# Patient Record
Sex: FEMALE | Race: OTHER | HISPANIC OR LATINO | URBAN - METROPOLITAN AREA
[De-identification: names, ages, dates, MRNs, and addresses within clinical notes are randomized per-mention and may not be internally consistent; named-entity substitution may affect disease eponyms.]

---

## 2023-05-18 ENCOUNTER — EMERGENCY (EMERGENCY)
Facility: HOSPITAL | Age: 33
LOS: 1 days | Discharge: HOME | End: 2023-05-18
Attending: STUDENT IN AN ORGANIZED HEALTH CARE EDUCATION/TRAINING PROGRAM
Payer: MEDICAID

## 2023-05-18 VITALS
OXYGEN SATURATION: 100 % | SYSTOLIC BLOOD PRESSURE: 132 MMHG | HEART RATE: 91 BPM | RESPIRATION RATE: 16 BRPM | DIASTOLIC BLOOD PRESSURE: 84 MMHG | TEMPERATURE: 98 F | WEIGHT: 130.07 LBS

## 2023-05-18 PROCEDURE — 99284 EMERGENCY DEPT VISIT MOD MDM: CPT

## 2023-05-18 NOTE — ED ADULT TRIAGE NOTE - CHIEF COMPLAINT QUOTE
Ambulatory  pregnant 10 weeks complaining of HA that started this afternoon @1700. Reports taking 4 acetaminophen tablets without relief. Reports that she had a HA all last weekend which resolved for approx 3-4 days but has now returned even stronger. Denies history of pre-eclampsia.

## 2023-05-19 VITALS
DIASTOLIC BLOOD PRESSURE: 74 MMHG | SYSTOLIC BLOOD PRESSURE: 113 MMHG | RESPIRATION RATE: 17 BRPM | OXYGEN SATURATION: 99 % | HEART RATE: 74 BPM | TEMPERATURE: 98 F

## 2023-05-19 LAB
APPEARANCE UR: CLEAR — SIGNIFICANT CHANGE UP
BACTERIA # UR AUTO: ABNORMAL
BILIRUB UR-MCNC: NEGATIVE — SIGNIFICANT CHANGE UP
COLOR SPEC: YELLOW — SIGNIFICANT CHANGE UP
DIFF PNL FLD: NEGATIVE — SIGNIFICANT CHANGE UP
EPI CELLS # UR: SIGNIFICANT CHANGE UP
GLUCOSE UR QL: NEGATIVE MG/DL — SIGNIFICANT CHANGE UP
KETONES UR-MCNC: ABNORMAL
LEUKOCYTE ESTERASE UR-ACNC: NEGATIVE — SIGNIFICANT CHANGE UP
NITRITE UR-MCNC: NEGATIVE — SIGNIFICANT CHANGE UP
PH UR: 7 — SIGNIFICANT CHANGE UP (ref 5–8)
PROT UR-MCNC: NEGATIVE — SIGNIFICANT CHANGE UP
RBC CASTS # UR COMP ASSIST: SIGNIFICANT CHANGE UP /HPF (ref 0–4)
SP GR SPEC: 1.01 — SIGNIFICANT CHANGE UP (ref 1.01–1.02)
UROBILINOGEN FLD QL: NEGATIVE MG/DL — SIGNIFICANT CHANGE UP
WBC UR QL: SIGNIFICANT CHANGE UP /HPF (ref 0–5)

## 2023-05-19 PROCEDURE — 81001 URINALYSIS AUTO W/SCOPE: CPT

## 2023-05-19 PROCEDURE — 96375 TX/PRO/DX INJ NEW DRUG ADDON: CPT

## 2023-05-19 PROCEDURE — 96374 THER/PROPH/DIAG INJ IV PUSH: CPT

## 2023-05-19 PROCEDURE — 99284 EMERGENCY DEPT VISIT MOD MDM: CPT | Mod: 25

## 2023-05-19 PROCEDURE — T1013: CPT

## 2023-05-19 PROCEDURE — 87086 URINE CULTURE/COLONY COUNT: CPT

## 2023-05-19 PROCEDURE — 96361 HYDRATE IV INFUSION ADD-ON: CPT

## 2023-05-19 RX ORDER — ONDANSETRON 8 MG/1
4 TABLET, FILM COATED ORAL ONCE
Refills: 0 | Status: COMPLETED | OUTPATIENT
Start: 2023-05-19 | End: 2023-05-19

## 2023-05-19 RX ORDER — ACETAMINOPHEN 500 MG
1000 TABLET ORAL ONCE
Refills: 0 | Status: COMPLETED | OUTPATIENT
Start: 2023-05-19 | End: 2023-05-19

## 2023-05-19 RX ORDER — METOCLOPRAMIDE HCL 10 MG
10 TABLET ORAL ONCE
Refills: 0 | Status: COMPLETED | OUTPATIENT
Start: 2023-05-19 | End: 2023-05-19

## 2023-05-19 RX ORDER — SODIUM CHLORIDE 9 MG/ML
1000 INJECTION, SOLUTION INTRAVENOUS ONCE
Refills: 0 | Status: COMPLETED | OUTPATIENT
Start: 2023-05-19 | End: 2023-05-19

## 2023-05-19 RX ADMIN — SODIUM CHLORIDE 1000 MILLILITER(S): 9 INJECTION, SOLUTION INTRAVENOUS at 04:02

## 2023-05-19 RX ADMIN — SODIUM CHLORIDE 1000 MILLILITER(S): 9 INJECTION, SOLUTION INTRAVENOUS at 02:40

## 2023-05-19 RX ADMIN — Medication 10 MILLIGRAM(S): at 04:46

## 2023-05-19 RX ADMIN — Medication 400 MILLIGRAM(S): at 02:40

## 2023-05-19 RX ADMIN — ONDANSETRON 4 MILLIGRAM(S): 8 TABLET, FILM COATED ORAL at 02:40

## 2023-05-19 NOTE — ED PROCEDURE NOTE - NS ED PROCEDURE ASSISTED BY
Supervision was available Well appearing, awake, alert, oriented to person, place, time/situation and in no apparent distress. normal...

## 2023-05-19 NOTE — ED PROCEDURE NOTE - PROCEDURE ADDITIONAL DETAILS
Educational Bedside U/S performed, IUP with . Explained to pt u/s study educational and not diagnostic and verbal consent provided.

## 2023-05-19 NOTE — ED PROVIDER NOTE - CLINICAL SUMMARY MEDICAL DECISION MAKING FREE TEXT BOX
31 yo female with pmhx of H/A's presents with a H/A since yesterday. Neurovascularly intact, no FND's. improvement of symptoms after meds. BP improved. UA without evidence of acute infectious processes. strict return precautions explained.

## 2023-05-19 NOTE — ED PROVIDER NOTE - PHYSICAL EXAMINATION
Gen: No acute distress, non toxic  HEENT: Mucous membranes moist, pink conjunctivae, EOMI no nystagmus. PERRL.   Neck: FROM. No neck stiffness.   CV: RRR, nl s1/s2.  Resp: CTAB, normal rate and effort. No wheezes, rhonchi or crackles  GI: Abdomen soft, NT, ND. No rebound, no guarding  Neuro: A&O x 3, moving all 4 extremities  MSK: No spine or joint tenderness to palpation  Skin: No rashes. intact and perfused. cap refill <2sec  Vascular: Radial and dorsalis pedal pulses 2+ b/l. No LE edema. Gen: No acute distress, non toxic. well appearing.  HEENT: Mucous membranes moist, pink conjunctivae, EOMI no nystagmus. PERRL. airway patent  Neck: FROM. No neck stiffness. neck supple   CV: RRR, nl s1/s2.  Resp: CTAB, normal rate and effort. No wheezes, rhonchi or crackles  GI: Abdomen soft, NT, ND. No rebound, no guarding  Neuro: A&O x4, MAEx4. 5/5 str ext x 4. Sensation intact, symmetric throughout. No FNDs. Gait intact. CN 2-12 intact. cerebellar fxn intact.   MSK: No spine or joint tenderness to palpation  Skin: No rashes. intact and perfused. cap refill <2sec  Vascular: Radial and dorsalis pedal pulses 2+ b/l. No LE edema.

## 2023-05-19 NOTE — ED PROVIDER NOTE - PATIENT PORTAL LINK FT
You can access the FollowMyHealth Patient Portal offered by [facility] by registering at the following website: [email or web address]. By joining "[email or web address]’s FollowMyHealth portal, you will also be able to view your health information using other applications (apps) compatible with our system.

## 2023-05-19 NOTE — ED PROVIDER NOTE - NS ED ROS FT
Gen: denies fever, chills,  Skin: denies rashes  HEENT: denies visual changes, ear pain, nasal congestion, throat pain  Respiratory: denies CHEUNG, SOB, cough, wheezing  Cardiovascular: denies chest pain, palpitations, diaphoresis, LE edema  GI: denies abdominal pain, n/v/d  : denies dysuria, frequency, urgency, bowel/bladder incontinence  MSK: denies joint swelling/pain, back pain, neck pain  Neuro: +headache. denies dizziness, LOC, weakness, numbness

## 2023-05-19 NOTE — ED ADULT NURSE NOTE - ED CARDIAC RATE
[History of Hormone Replacement Treatment] : has no history of hormone replacement treatment [FreeTextEntry2] : Miscarriage 1 normal [FreeTextEntry6] : No [FreeTextEntry7] : hx of OCP use [FreeTextEntry8] : n/a

## 2023-05-19 NOTE — ED PROVIDER NOTE - ATTENDING APP SHARED VISIT CONTRIBUTION OF CARE
32y F w/ no significant PMH, currently 9.5 weeks pregnant, presents for headache. Says it feels similar to prior headaches. No fever, chest pain, SOB, vaginal bleeding, urinary complaints. No focal neuro deficits on exam. Initially mildly hypertensive. Felt better with symptomatic treatment. Rpt BP improved. Discharged in stable condition with strict return precautions.

## 2023-05-19 NOTE — ED PROVIDER NOTE - OBJECTIVE STATEMENT
33 yo female with pmhx of H/A's presents with a H/A since yesterday.  Pt reports this H/A feels similar to the H/A's she has had in the past 31 yo female with pmhx of H/A's presents with a H/A since yesterday.  Pt reports this H/A feels similar to the H/A's she has had in the past    Denies fever, chills, body aches, dizziness, LOC, vision changes, CP, palpitations, SOB, abd pain, N/V/C/D, dysuria, hematuria, vaginal discharge, vaginal bleeding, back pain, paresthesias in the extremities, rashes. 31 yo female with pmhx of H/A's presents with a H/A since yesterday. Pt reports she is 9 and a half wks pregnant. Pt reports a H/A "all over" and states it has been a gradual onset. Denies worst H/A of her life. Pt reports this H/A feels similar to the H/A's she has had in the past. Reports a H/A similarly last week, went to  and was given tylenol with caffeine which improved her symptoms. Has been taking tylenol at home for pain- last dose was at 6pm, only little relief. Denies photophobia. Assoc with nausea. Pt also endorsing dysuria. Denies fever, chills, body aches, dizziness, LOC, vision changes, CP, palpitations, SOB, abd pain, N/V/C/D, dysuria, hematuria, vaginal discharge, vaginal bleeding, back pain, neck pain, paresthesias in the extremities, rashes.

## 2023-05-19 NOTE — ED PROVIDER NOTE - NSFOLLOWUPINSTRUCTIONS_ED_ALL_ED_FT
- Please follow-up with your primary care doctor in the next 1-2 days.  Please call tomorrow for an appointment.  If you cannot follow-up with your primary care doctor please return to the ED for any urgent issues.  - You were given a copy of the tests performed today.  Please bring the results with you and review them with your primary care doctor.  - If you have any worsening of symptoms or any other concerns please return to the ED immediately.  - Please continue taking your home medications as directed.     Headache    A headache is pain or discomfort felt around the head or neck area. The specific cause of a headache may not be found as there are many types including tension headaches, migraine headaches, and cluster headaches. Watch your condition for any changes. Things you can do to manage your pain include taking over the counter and prescription medications as instructed by your health care provider, lying down in a dark quiet room, limiting stress, getting regular sleep, and refraining from alcohol and tobacco products.    SEEK IMMEDIATE MEDICAL CARE IF YOU HAVE ANY OF THE FOLLOWING SYMPTOMS: fever, vomiting, stiff neck, loss of vision, problems with speech, muscle weakness, loss of balance, trouble walking, passing out, or confusion.     - Realice un seguimiento con cook médico de atención primaria en los próximos 1 o 2 ernie. Por favor llame mañana para joelle brittany. Si no puede hacer un seguimiento con cook médico de atención primaria, regrese al servicio de urgencias para cualquier problema urgente.  - Se le entregó joelle copia de las pruebas realizadas hoy. Traiga los resultados con usted y revíselos con cook médico de atención primaria.  - Si tiene algún empeoramiento de los síntomas o cualquier otra inquietud, regrese al servicio de urgencias de inmediato.  - Continúe tomando gabe medicamentos en el hogar según las indicaciones.    Dolor de cesar    Un dolor de cesar es un dolor o molestia que se siente alrededor del área de la cesar o el charissa. Es posible que no se encuentre la causa específica de un dolor de cesar, ya que hay muchos tipos, incluidos los siri de cesar por tensión, los siri de cesar por migraña y los siri de cesar en racimo. Wendi cook condición para cualquier cambio. Las cosas que puede hacer para controlar cook dolor incluyen emiliano medicamentos de venta lu y recetados según las instrucciones de cook proveedor de atención médica, recostarse en joelle habitación oscura y tranquila, limitar el estrés, dormir con regularidad y abstenerse de consumir alcohol y productos de tabaco.    BUSQUE ATENCIÓN MÉDICA INMEDIATA SI TIENE ALGUNO DE LOS SIGUIENTES SÍNTOMAS: fiebre, vómitos, rigidez en el charissa, pérdida de la visión, problemas con el habla, debilidad muscular, pérdida del equilibrio, dificultad para caminar, desmayo o confusión.

## 2023-05-20 LAB
CULTURE RESULTS: SIGNIFICANT CHANGE UP
SPECIMEN SOURCE: SIGNIFICANT CHANGE UP

## 2023-05-31 PROBLEM — Z00.00 ENCOUNTER FOR PREVENTIVE HEALTH EXAMINATION: Status: ACTIVE | Noted: 2023-05-31

## 2023-06-13 ENCOUNTER — NON-APPOINTMENT (OUTPATIENT)
Age: 33
End: 2023-06-13

## 2023-06-14 ENCOUNTER — APPOINTMENT (OUTPATIENT)
Dept: ANTEPARTUM | Facility: CLINIC | Age: 33
End: 2023-06-14
Payer: MEDICAID

## 2023-06-14 ENCOUNTER — ASOB RESULT (OUTPATIENT)
Age: 33
End: 2023-06-14

## 2023-06-14 DIAGNOSIS — N91.2 AMENORRHEA, UNSPECIFIED: ICD-10-CM

## 2023-06-14 DIAGNOSIS — O35.10X0 MATERNAL CARE FOR (SUSPECTED) CHROMOSOMAL ABNORMALITY IN FETUS, UNSPECIFIED, NOT APPLICABLE OR UNSPECIFIED: ICD-10-CM

## 2023-06-14 PROCEDURE — 36416 COLLJ CAPILLARY BLOOD SPEC: CPT

## 2023-06-14 PROCEDURE — 76813 OB US NUCHAL MEAS 1 GEST: CPT

## 2023-06-19 LAB
ADDITIONAL US: NORMAL
COMMENTS: AFP: NORMAL
CRL SCAN TWIN B: NORMAL
CRL SCAN: NORMAL
CROWN RUMP LENGTH TWIN B: NORMAL
CROWN RUMP LENGTH: 70.2 MM
DOWN SYNDROME AGE RISK: NORMAL
DOWN SYNDROME INTERPRETATION: NORMAL
DOWN SYNDROME SCREENING RISK: NORMAL
GEST. AGE ON COLLECTION DATE: 13 WEEKS
HCG MOM: 0.65
HCG VALUE: 61.1 IU/ML
MATERNAL AGE AT EDD: 31.2 YR
NOTE: AFP: NORMAL
NT MOM TWIN B: NORMAL
NT TWIN B: NORMAL
NUCHAL TRANSLUCENCY (NT): 1.8 MM
NUCHAL TRANSLUCENCY MOM: 1.01
NUMBER OF FETUSES: 1
PAPP-A MOM: 0.98
PAPP-A VALUE: 1309.8 NG/ML
RACE: NORMAL
RESULTS AFP: NORMAL
SONOGRAPHER ID#: NORMAL
SUBMIT PART 2 SAMPLE USING: NORMAL
TEST RESULTS: AFP: NORMAL
TRISOMY 18 AGE RISK: NORMAL
TRISOMY 18 INTERPRETATION: NORMAL
TRISOMY 18 SCREENING RISK: NORMAL
WEIGHT AFP: 139 LBS

## 2023-06-21 ENCOUNTER — ASOB RESULT (OUTPATIENT)
Age: 33
End: 2023-06-21

## 2023-06-21 ENCOUNTER — APPOINTMENT (OUTPATIENT)
Dept: MATERNAL FETAL MEDICINE | Facility: CLINIC | Age: 33
End: 2023-06-21
Payer: MEDICAID

## 2023-06-21 PROCEDURE — 99442: CPT

## 2023-06-27 ENCOUNTER — APPOINTMENT (OUTPATIENT)
Dept: ANTEPARTUM | Facility: CLINIC | Age: 33
End: 2023-06-27
Payer: MEDICAID

## 2023-06-27 DIAGNOSIS — Z13.71 ENCOUNTER FOR NONPROCREATIVE SCREENING FOR GENETIC DISEASE CARRIER STATUS: ICD-10-CM

## 2023-06-27 PROCEDURE — 36415 COLL VENOUS BLD VENIPUNCTURE: CPT

## 2023-07-12 ENCOUNTER — APPOINTMENT (OUTPATIENT)
Dept: MATERNAL FETAL MEDICINE | Facility: CLINIC | Age: 33
End: 2023-07-12
Payer: MEDICAID

## 2023-07-12 ENCOUNTER — ASOB RESULT (OUTPATIENT)
Age: 33
End: 2023-07-12

## 2023-07-12 PROCEDURE — 99442: CPT

## 2023-07-18 ENCOUNTER — APPOINTMENT (OUTPATIENT)
Dept: ANTEPARTUM | Facility: CLINIC | Age: 33
End: 2023-07-18

## 2023-07-18 ENCOUNTER — APPOINTMENT (OUTPATIENT)
Dept: ANTEPARTUM | Facility: CLINIC | Age: 33
End: 2023-07-18
Payer: MEDICAID

## 2023-07-18 PROCEDURE — 36415 COLL VENOUS BLD VENIPUNCTURE: CPT

## 2023-07-24 ENCOUNTER — APPOINTMENT (OUTPATIENT)
Dept: ANTEPARTUM | Facility: CLINIC | Age: 33
End: 2023-07-24

## 2023-07-24 LAB
ADDITIONAL US: NORMAL
AFP MOM: 0.73
AFP VALUE: 31.5 NG/ML
COLLECTED ON 2: NORMAL
COLLECTED ON: NORMAL
CRL SCAN TWIN B: NORMAL
CRL SCAN: NORMAL
CROWN RUMP LENGTH TWIN B: NORMAL
CROWN RUMP LENGTH: 70.2 MM
DIA MOM: 1.21
DIA VALUE: 189.8 PG/ML
DOWN SYNDROME AGE RISK: NORMAL
DOWN SYNDROME INTERPRETATION: NORMAL
DOWN SYNDROME SCREENING RISK: NORMAL
FIRST TRIMESTER SAMPLE: NORMAL
GEST. AGE ON COLLECTION DATE: 13 WEEKS
GESTATIONAL AGE: 17.9 WEEKS
HCG MOM: 0.38
HCG VALUE: 10.6 IU/ML
INSULIN DEP DIABETES: NO
MATERNAL AGE AT EDD: 31.2 YR
NT MOM TWIN B: NORMAL
NT TWIN B: NORMAL
NUCHAL TRANSLUCENCY (NT): 1.8 MM
NUCHAL TRANSLUCENCY MOM: 1.01
NUMBER OF FETUSES: 1
OPEN SPINA BIFIDA: NORMAL
OSB INTERPRETATION: NORMAL
PAPP-A MOM: 0.98
PAPP-A VALUE: 1309.8 NG/ML
RACE: NORMAL
SECOND TRIMESTER SAMPLE: NORMAL
SEQUENTIAL 2 COMMENTS: NORMAL
SEQUENTIAL 2 NOTE: NORMAL
SEQUENTIAL 2 RESULTS: NORMAL
SEQUENTIAL 2 TEST RESULTS: NORMAL
SONOGRAPHER ID#: NORMAL
TRISOMY 18 AGE RISK: NORMAL
TRISOMY 18 INTERPRETATION: NORMAL
TRISOMY 18 SCREENING RISK: NORMAL
UE3 MOM: 0.97
UE3 VALUE: 1.35 NG/ML
WEIGHT AFP: 139 LBS
WEIGHT: 143 LBS

## 2023-07-25 DIAGNOSIS — Z86.32 PERSONAL HISTORY OF GESTATIONAL DIABETES: ICD-10-CM

## 2023-07-25 DIAGNOSIS — G43.909 MIGRAINE, UNSPECIFIED, NOT INTRACTABLE, W/OUT STATUS MIGRAINOSUS: ICD-10-CM

## 2023-07-25 DIAGNOSIS — Z01.818 ENCOUNTER FOR OTHER PREPROCEDURAL EXAMINATION: ICD-10-CM

## 2023-07-25 DIAGNOSIS — Z33.2 ENCOUNTER FOR ELECTIVE TERMINATION OF PREGNANCY: ICD-10-CM

## 2023-07-27 ENCOUNTER — APPOINTMENT (OUTPATIENT)
Dept: OBGYN | Facility: CLINIC | Age: 33
End: 2023-07-27
Payer: MEDICAID

## 2023-07-27 PROCEDURE — 99443: CPT

## 2023-07-27 RX ORDER — IBUPROFEN 600 MG/1
600 TABLET, FILM COATED ORAL 4 TIMES DAILY
Qty: 60 | Refills: 0 | Status: ACTIVE | COMMUNITY
Start: 2023-07-27 | End: 1900-01-01

## 2023-07-27 RX ORDER — ONDANSETRON 4 MG/1
4 TABLET ORAL
Qty: 20 | Refills: 0 | Status: ACTIVE | COMMUNITY
Start: 2023-07-27 | End: 1900-01-01

## 2023-07-27 RX ORDER — CABERGOLINE 0.5 MG/1
0.5 TABLET ORAL
Qty: 2 | Refills: 0 | Status: ACTIVE | COMMUNITY
Start: 2023-07-27 | End: 1900-01-01

## 2023-07-27 RX ORDER — DOXYCYCLINE HYCLATE 100 MG/1
100 TABLET ORAL
Qty: 2 | Refills: 0 | Status: ACTIVE | COMMUNITY
Start: 2023-07-27 | End: 1900-01-01

## 2023-07-28 NOTE — PLAN
[FreeTextEntry1] : 33 yo  at 19w1d with plan for DE at 20w1d  EDC 2023 by LMP 3/15/23.\par \par \par 1.Dilation and Evacuation \par -All available records and ultrasounds have been reviewed \par - Pt does not desire induction of labor\par -All consents reviewed and/or signed today, all questions/concerns addressed\par - Patient offered pamphlet for support services- will give in person\par - Reviewed disposal of remains, hospital vs. private burial.  Patient understands her responsibility for fetal disposition. Will consult SW in house.\par \par 2. Surgery scheduling\par - Patient to be precertified for D+E\par - D+E scheduled for 8/3/23\par - PSTs not required\par - CBC  testing and reviewed\par \par 3. ID/Cervical prep\par - GC/CT- will offer at time of dilators with full STI testing\par - doxycycline 200 mg in OR\par - Ibuprofen 600 mg po q 6 hours - Rx sent\par - doxycycline 100mg BID x 1 day for day of dilator placement\par \par 4. Labs/Blood type\par - Preop CBC and Covid test\par - Type and Screen on arrival\par - Rhogam pending results\par \par 5. Contraception/Future Pregnancy Plans\par - Patient counseled on all contraceptive options\par - Patient thinking about hormonal IUD\par \par 6. > 18 weeks, breast health reviewed\par - cabergoline 1mg post op Rx sent\par - Cold compresses, tight bras, ibuprofen reviewed\par \par 7. Post-op\par - Post-operative telehealth or in person visit optional- to be scheduled in 2 weeks\par - Post-operative instruction sheet reviewed/given, reviewed bleeding and infection precautions\par - Provided 24 hour contact phone number\par - All questions/concerns of patient addressed to their satisfaction\par

## 2023-07-28 NOTE — HISTORY OF PRESENT ILLNESS
[FreeTextEntry1] : Audiovisual Televisit\par [facility]\par Provider location: office, [address] [place] [address], [place], [place]\par \par  # [id number]\par \par 33 yo  at 19w1d by EDC 2023 by LMP 3/15/23\par \par All: ASA, peptobismol- rash\par Meds: denies\par Obhx: c-secx1, med ab x1\par Gynhx: denies\par PMh/PSH: rhinoplasty, ; reports irregular heart beat at time of rhinoplasty- pt told it was minor and did not need follow up\par SH: deneisx3\par \par D+E Counseling\par \par Risks of D&E including:\par 1.	Infection: Patient was counseled on risk of infection and the use of prophylactic antibiotics, signs/symptoms of pre- and post-operative infection were reviewed. \par 2.	Hemorrhage: Patient was counseled on the risk of hemorrhage, possibly requiring blood (and/or blood products) transfusion, management including use of but not limited to uterotonic medications. PT HAS NO OBJECTIONS TO BLOOD TRANSFUSION OR RECEIVING BLOOD PRODUCTS.\par 3.	Injury/Perforation:  Risk of injury to vagina, cervix, uterus reviewed. Patient was counseled on the risk of uterine perforation with/without need for laparoscopy/laparotomy with/without injury to adjacent organs such as bowel/bladder. Reviewed risk of hysterectomy.\par 4.            Risk of retained products of conception  with/without need for medication or suction procedure to empty the uterus. \par \par The evidence to support minimal risk of harm to subsequent pregnancies or the ability to carry a subsequent pregnancy to term, and absence of evidence supporting adverse psychological effects were discussed.  \par \par Need for cervical ripening with mifepristone, pre-operative laminaria placement were also discussed; the accompanying risks of infection, bleeding, injury, rupture of membranes, and  labor were reviewed. The risks of delivery of a nonviable  were reviewed.  They understand these risks and agrees to above. They are aware to go to [facility] for any emergency and to avoid [facility].\par \par The patient also understands it is their responsibility to bring to the attention of their physician any unusual symptoms following the  and to report to follow-up examinations.  \par \par Medication management reviewed.\par \par [place] regulations were reviewed and since the pregnancy is greater than 20 weeks it is the patient is aware of their role in disposition of fetal remains. \par \par They are sure of their decision and deny any coercion from family, friends or healthcare providers. The patient had the opportunity to ask questions and all questions were answered.  \par \par

## 2023-08-02 ENCOUNTER — APPOINTMENT (OUTPATIENT)
Dept: OBGYN | Facility: CLINIC | Age: 33
End: 2023-08-02
Payer: MEDICAID

## 2023-08-02 ENCOUNTER — TRANSCRIPTION ENCOUNTER (OUTPATIENT)
Age: 33
End: 2023-08-02

## 2023-08-02 VITALS
SYSTOLIC BLOOD PRESSURE: 102 MMHG | DIASTOLIC BLOOD PRESSURE: 74 MMHG | HEART RATE: 102 BPM | TEMPERATURE: 98.6 F | OXYGEN SATURATION: 98 %

## 2023-08-02 VITALS — DIASTOLIC BLOOD PRESSURE: 70 MMHG | SYSTOLIC BLOOD PRESSURE: 120 MMHG

## 2023-08-02 DIAGNOSIS — Z11.3 ENCOUNTER FOR SCREENING FOR INFECTIONS WITH A PREDOMINANTLY SEXUAL MODE OF TRANSMISSION: ICD-10-CM

## 2023-08-02 PROCEDURE — 99215 OFFICE O/P EST HI 40 MIN: CPT | Mod: 25

## 2023-08-02 PROCEDURE — 59200 INSERT CERVICAL DILATOR: CPT

## 2023-08-02 RX ORDER — MIFEPRISTONE 200 MG
200 TABLET ORAL
Refills: 0 | Status: COMPLETED | OUTPATIENT
Start: 2023-08-02

## 2023-08-02 RX ORDER — MIFEPRISTONE 200 MG
200 TABLET ORAL
Refills: 0 | Status: ACTIVE | COMMUNITY
Start: 2023-08-02

## 2023-08-02 RX ADMIN — Medication 0 MG: at 00:00

## 2023-08-02 NOTE — PLAN
[FreeTextEntry1] : 33 yo s/p dilator insertion and mifepristone administration for DE tomorrow. - SW consult for funneral home disposition and IPV - LNG IUD added to consent All questions answered.  ADDENDUM Pt visibly upset after dilator insertion and tearful. I counseled on potential removal of dilators and risks of continuing pregnancy including but not limited to infection,  labor,  delivery, ROM, miscarriage. Pt expresses that she is 100% sure of her decision to terminate but wishes her circumstance were different. Referral to [facility] program will be seen by inpatient SW.

## 2023-08-02 NOTE — PHYSICAL EXAM
[Chaperone Present] : A chaperone was present in the examining room during all aspects of the physical examination [Appropriately responsive] : appropriately responsive [Alert] : alert [No Acute Distress] : no acute distress [Soft] : soft [Non-tender] : non-tender [Non-distended] : non-distended [Oriented x3] : oriented x3 [FreeTextEntry1] : Elia Kemp LPN

## 2023-08-02 NOTE — HISTORY OF PRESENT ILLNESS
[FreeTextEntry1] : 33 yo at 20 weeks presenting for dilators and mifepristone for induced . : Daria Lancaster RN  Pt discloses today that her partner is very jealous. They are no longer living together. He wants her to keep the pregnancy. He is not the FOB. She says she is not in danger nor is her daughter.  contraception counseling performed. All LARC/SARC methods reviewed. Pt would like a post  hormonal IUD.  The patient was counseled on the risks, benefits and alternatives to the LNG-IUS.  The patient understand the risks of infection, bleeding and trauma (specifically to the vagina, cervix, uterus, ovaries, fallopian tubes, bowel, bladder) and the small chance of IUD embedment, incorrect placement, incomplete removal and uterine perforation.  They voiced understanding that if uterine perforation, embedment or incomplete removal occurs that laparoscopy or hysteroscopy might be necessary to remove the IUD, and may or may not be successful in IUD removal.   The patient was counseled on potential side effects including changes in bleeding, specifically irregular bleeding in the first 3-6 months followed by increased likelihood of amenorrhea.  They were counseled on the increased risk of infection in the 3 weeks following insertion, the risk of pregnancy, and of ectopic pregnancy (and the need to see a physician immediately if she experiences signs/symptoms of pregnancy), and the risk of expulsion.  Reviewed immediate efficacy for contraception.

## 2023-08-03 ENCOUNTER — TRANSCRIPTION ENCOUNTER (OUTPATIENT)
Age: 33
End: 2023-08-03

## 2023-08-03 ENCOUNTER — RESULT REVIEW (OUTPATIENT)
Age: 33
End: 2023-08-03

## 2023-08-03 ENCOUNTER — APPOINTMENT (OUTPATIENT)
Dept: OBGYN | Facility: HOSPITAL | Age: 33
End: 2023-08-03
Payer: MEDICAID

## 2023-08-03 ENCOUNTER — OUTPATIENT (OUTPATIENT)
Dept: OUTPATIENT SERVICES | Facility: HOSPITAL | Age: 33
LOS: 1 days | End: 2023-08-03
Payer: MEDICAID

## 2023-08-03 VITALS
HEART RATE: 77 BPM | SYSTOLIC BLOOD PRESSURE: 114 MMHG | RESPIRATION RATE: 14 BRPM | OXYGEN SATURATION: 100 % | TEMPERATURE: 99 F | DIASTOLIC BLOOD PRESSURE: 73 MMHG

## 2023-08-03 VITALS
WEIGHT: 145.06 LBS | DIASTOLIC BLOOD PRESSURE: 59 MMHG | TEMPERATURE: 99 F | HEART RATE: 86 BPM | SYSTOLIC BLOOD PRESSURE: 122 MMHG | HEIGHT: 65 IN | OXYGEN SATURATION: 100 % | RESPIRATION RATE: 16 BRPM

## 2023-08-03 DIAGNOSIS — Z33.2 ENCOUNTER FOR ELECTIVE TERMINATION OF PREGNANCY: ICD-10-CM

## 2023-08-03 DIAGNOSIS — Z3A.19 19 WEEKS GESTATION OF PREGNANCY: ICD-10-CM

## 2023-08-03 LAB
ABO RH CONFIRMATION: SIGNIFICANT CHANGE UP
BLD GP AB SCN SERPL QL: SIGNIFICANT CHANGE UP

## 2023-08-03 PROCEDURE — 88235 TISSUE CULTURE PLACENTA: CPT

## 2023-08-03 PROCEDURE — 88285 CHROMOSOME COUNT ADDITIONAL: CPT

## 2023-08-03 PROCEDURE — 59000 AMNIOCENTESIS DIAGNOSTIC: CPT

## 2023-08-03 PROCEDURE — 58300 INSERT INTRAUTERINE DEVICE: CPT

## 2023-08-03 PROCEDURE — 88269 CHROMOSOME ANALYS AMNIOTIC: CPT

## 2023-08-03 PROCEDURE — 58300 INSERT INTRAUTERINE DEVICE: CPT | Mod: GC

## 2023-08-03 PROCEDURE — 76998 US GUIDE INTRAOP: CPT | Mod: 26,GC

## 2023-08-03 PROCEDURE — 88300 SURGICAL PATH GROSS: CPT | Mod: 26,59

## 2023-08-03 PROCEDURE — 86850 RBC ANTIBODY SCREEN: CPT

## 2023-08-03 PROCEDURE — 59841 INDUCED ABORTION DILAT&EVAC: CPT

## 2023-08-03 PROCEDURE — 88305 TISSUE EXAM BY PATHOLOGIST: CPT | Mod: 26

## 2023-08-03 PROCEDURE — T1013: CPT

## 2023-08-03 PROCEDURE — 59841 INDUCED ABORTION DILAT&EVAC: CPT | Mod: GC

## 2023-08-03 PROCEDURE — 88305 TISSUE EXAM BY PATHOLOGIST: CPT

## 2023-08-03 PROCEDURE — 86900 BLOOD TYPING SEROLOGIC ABO: CPT

## 2023-08-03 PROCEDURE — 36415 COLL VENOUS BLD VENIPUNCTURE: CPT

## 2023-08-03 PROCEDURE — 86901 BLOOD TYPING SEROLOGIC RH(D): CPT

## 2023-08-03 PROCEDURE — 88300 SURGICAL PATH GROSS: CPT

## 2023-08-03 PROCEDURE — 88280 CHROMOSOME KARYOTYPE STUDY: CPT

## 2023-08-03 DEVICE — IUD MIRENA: Type: IMPLANTABLE DEVICE | Status: FUNCTIONAL

## 2023-08-03 RX ORDER — LEVONORGESTREL 1.5 MG/1
52 TABLET ORAL ONCE
Refills: 0 | Status: DISCONTINUED | OUTPATIENT
Start: 2023-08-03 | End: 2023-08-03

## 2023-08-03 RX ORDER — SODIUM CHLORIDE 9 MG/ML
1000 INJECTION, SOLUTION INTRAVENOUS
Refills: 0 | Status: DISCONTINUED | OUTPATIENT
Start: 2023-08-03 | End: 2023-08-03

## 2023-08-03 RX ORDER — ONDANSETRON 8 MG/1
4 TABLET, FILM COATED ORAL ONCE
Refills: 0 | Status: DISCONTINUED | OUTPATIENT
Start: 2023-08-03 | End: 2023-08-03

## 2023-08-03 RX ORDER — IBUPROFEN 200 MG
1 TABLET ORAL
Qty: 28 | Refills: 0
Start: 2023-08-03 | End: 2023-08-09

## 2023-08-03 RX ORDER — SODIUM CHLORIDE 9 MG/ML
1000 INJECTION, SOLUTION INTRAVENOUS ONCE
Refills: 0 | Status: COMPLETED | OUTPATIENT
Start: 2023-08-03 | End: 2023-08-03

## 2023-08-03 RX ORDER — SODIUM CHLORIDE 9 MG/ML
3 INJECTION INTRAMUSCULAR; INTRAVENOUS; SUBCUTANEOUS ONCE
Refills: 0 | Status: DISCONTINUED | OUTPATIENT
Start: 2023-08-03 | End: 2023-08-03

## 2023-08-03 RX ORDER — ACETAMINOPHEN 500 MG
3 TABLET ORAL
Qty: 84 | Refills: 0
Start: 2023-08-03 | End: 2023-08-09

## 2023-08-03 RX ORDER — ACETAMINOPHEN 500 MG
975 TABLET ORAL ONCE
Refills: 0 | Status: COMPLETED | OUTPATIENT
Start: 2023-08-03 | End: 2023-08-03

## 2023-08-03 RX ORDER — FENTANYL CITRATE 50 UG/ML
50 INJECTION INTRAVENOUS
Refills: 0 | Status: DISCONTINUED | OUTPATIENT
Start: 2023-08-03 | End: 2023-08-03

## 2023-08-03 RX ADMIN — FENTANYL CITRATE 50 MICROGRAM(S): 50 INJECTION INTRAVENOUS at 14:45

## 2023-08-03 RX ADMIN — Medication 975 MILLIGRAM(S): at 11:19

## 2023-08-03 RX ADMIN — SODIUM CHLORIDE 2000 MILLILITER(S): 9 INJECTION, SOLUTION INTRAVENOUS at 14:03

## 2023-08-03 RX ADMIN — FENTANYL CITRATE 50 MICROGRAM(S): 50 INJECTION INTRAVENOUS at 14:03

## 2023-08-03 NOTE — ASU DISCHARGE PLAN (ADULT/PEDIATRIC) - NS MD DC FALL RISK RISK
For information on Fall & Injury Prevention, visit: [email or web address] OR  [email or web address] OR  https://www.cdc.gov/steadi/patient.html

## 2023-08-03 NOTE — ASU DISCHARGE PLAN (ADULT/PEDIATRIC) - ACTIVITY LEVEL
4 weeks/Weight bearing as tolerated/Nothing per vagina/No tub baths/No douching/No tampons/No intercourse

## 2023-08-03 NOTE — BRIEF OPERATIVE NOTE - NSICDXBRIEFPREOP_GEN_ALL_CORE_FT
PRE-OP DIAGNOSIS:  Induced termination of pregnancy 03-Aug-2023 12:58:31  Anya Barragan   PRE-OP DIAGNOSIS:  Induced termination of pregnancy 03-Aug-2023 12:58:31  Anya Barragan  IUD contraception 03-Aug-2023 13:04:59  Anya Barragan

## 2023-08-03 NOTE — BRIEF OPERATIVE NOTE - NSICDXBRIEFPOSTOP_GEN_ALL_CORE_FT
POST-OP DIAGNOSIS:  Induced termination of pregnancy 03-Aug-2023 12:58:34  Anya Barragan   POST-OP DIAGNOSIS:  Induced termination of pregnancy 03-Aug-2023 12:58:34  Anya Barragan  IUD contraception 03-Aug-2023 13:05:02  Anya Barragan

## 2023-08-03 NOTE — BRIEF OPERATIVE NOTE - OPERATION/FINDINGS
20wk size uterus, evacuated of products of conception sent to pathology, cervix with 2cm cervical laceration at 11 o'clock position repaired with suture. monsels placed with adequate hemostasis. 20wk size uterus, evacuated of products of conception sent to pathology, cervix with 2cm cervical laceration at 11 o'clock position repaired with suture. monsels placed with adequate hemostasis. Mirena IUD placed LOT# [id number], exp 05/2025

## 2023-08-03 NOTE — BRIEF OPERATIVE NOTE - NSICDXBRIEFPROCEDURE_GEN_ALL_CORE_FT
PROCEDURES:  , induced, by dilation and evacuation 03-Aug-2023 12:58:02  Anya Barragan   PROCEDURES:  , induced, by dilation and evacuation 03-Aug-2023 12:58:02  Anya Barragan  IUD insertion 03-Aug-2023 13:04:45 Anya Hughes

## 2023-08-03 NOTE — ASU DISCHARGE PLAN (ADULT/PEDIATRIC) - CARE PROVIDER_API CALL
Mone Araujo  Obstetrics and Gynecology  [address] [place] [address], [address]  [place] [address]  Phone: [phone]  Fax: [phone]  Follow Up Time:

## 2023-08-04 LAB
C TRACH RRNA SPEC QL NAA+PROBE: NOT DETECTED
CANDIDA VAG CYTO: NOT DETECTED
G VAGINALIS+PREV SP MTYP VAG QL MICRO: NOT DETECTED
N GONORRHOEA RRNA SPEC QL NAA+PROBE: NOT DETECTED
SOURCE AMPLIFICATION: NORMAL
T VAGINALIS VAG QL WET PREP: NOT DETECTED

## 2023-08-07 LAB — SURGICAL PATHOLOGY STUDY: SIGNIFICANT CHANGE UP

## 2023-08-08 ENCOUNTER — ASOB RESULT (OUTPATIENT)
Age: 33
End: 2023-08-08

## 2023-08-08 ENCOUNTER — APPOINTMENT (OUTPATIENT)
Dept: MATERNAL FETAL MEDICINE | Facility: CLINIC | Age: 33
End: 2023-08-08
Payer: MEDICAID

## 2023-08-08 PROCEDURE — 99442: CPT

## 2023-08-15 LAB
CHROM ANALY OVERALL INTERP SPEC-IMP: SIGNIFICANT CHANGE UP
NYS PRENATAL DIAGNOSIS FOLLOW-UP QUESTIONNAIRE: SIGNIFICANT CHANGE UP

## 2023-08-16 ENCOUNTER — APPOINTMENT (OUTPATIENT)
Dept: OBGYN | Facility: CLINIC | Age: 33
End: 2023-08-16
Payer: MEDICAID

## 2023-08-16 PROCEDURE — 99441: CPT

## 2023-08-16 NOTE — PLAN
[FreeTextEntry1] : 33 yo s/p DE and implant insertion doing well. 1.Dilation and Evacuation -Patient is recovering well.  No signs/symptoms of infection.  -Reviewed pathology from procedure -Reviewed that first period may be heavier than normal.  -Patient is cleared to return to all physical activities  2.Contraception -Nexplanon  3.  Psych - pt given social work/psych information sheet at consultation - consult requested by [facility]  4. Follow-up - Patient referred back to her primary Ob-gyn,  for routine care - - all questions/concerns addressed of pt to their satisfaction

## 2023-08-16 NOTE — HISTORY OF PRESENT ILLNESS
[FreeTextEntry1] :  Siva [id number]  31 yo sp DE two weeks ago with nexplanon insertion. Pt reports feeling well. Some pain when she hold in her urine. Light bleeding So far happy with implant. Discussed that bleeding pattern at 3-4 months is the way she will continue to bleed throughout duration of the implant.

## (undated) DEVICE — VENODYNE/SCD SLEEVE CALF MEDIUM

## (undated) DEVICE — GLV 8.5 PROTEXIS (WHITE)

## (undated) DEVICE — PREP DYNA-HEX CHG 4% 4OZ BOTTLE (BACTOSHIELD)

## (undated) DEVICE — PREP TRAY DRY SKIN PREP SCRUB

## (undated) DEVICE — TUBING ASPIRATION HANDLE

## (undated) DEVICE — GLV 7 PROTEXIS (WHITE)

## (undated) DEVICE — WARMING BLANKET UPPER ADULT

## (undated) DEVICE — SUCTION YANKAUER TAPERED BULBOUS NO VENT

## (undated) DEVICE — PACK LITHOTOMY

## (undated) DEVICE — TUBING MEDI-VAC W MAXIGRIP CONNECTORS 1/4"X6'

## (undated) DEVICE — LAP PAD W RING 18 X 18"

## (undated) DEVICE — NDL COUNTER FOAM AND MAGNET 40-70

## (undated) DEVICE — DRAPE LIGHT HANDLE COVER (GREEN)